# Patient Record
Sex: MALE | URBAN - METROPOLITAN AREA
[De-identification: names, ages, dates, MRNs, and addresses within clinical notes are randomized per-mention and may not be internally consistent; named-entity substitution may affect disease eponyms.]

---

## 2019-09-26 DIAGNOSIS — M25.562 LEFT KNEE PAIN, UNSPECIFIED CHRONICITY: Primary | ICD-10-CM

## 2019-09-30 ENCOUNTER — OFFICE VISIT (OUTPATIENT)
Dept: ORTHOPEDICS | Facility: CLINIC | Age: 29
End: 2019-09-30
Payer: MEDICAID

## 2019-09-30 VITALS
BODY MASS INDEX: 31.83 KG/M2 | DIASTOLIC BLOOD PRESSURE: 70 MMHG | HEIGHT: 68 IN | WEIGHT: 210 LBS | SYSTOLIC BLOOD PRESSURE: 120 MMHG

## 2019-09-30 DIAGNOSIS — S86.812A: ICD-10-CM

## 2019-09-30 DIAGNOSIS — M25.562 ACUTE PAIN OF LEFT KNEE: Primary | ICD-10-CM

## 2019-09-30 PROCEDURE — 76882 US LMTD JT/FCL EVL NVASC XTR: CPT | Mod: 26,S$PBB,, | Performed by: ORTHOPAEDIC SURGERY

## 2019-09-30 PROCEDURE — 76882 US LMTD JT/FCL EVL NVASC XTR: CPT | Mod: PBBFAC,PN | Performed by: ORTHOPAEDIC SURGERY

## 2019-09-30 PROCEDURE — 99999 PR PBB SHADOW E&M-EST. PATIENT-LVL II: ICD-10-PCS | Mod: PBBFAC,,, | Performed by: ORTHOPAEDIC SURGERY

## 2019-09-30 PROCEDURE — 99204 PR OFFICE/OUTPT VISIT, NEW, LEVL IV, 45-59 MIN: ICD-10-PCS | Mod: 25,S$PBB,, | Performed by: ORTHOPAEDIC SURGERY

## 2019-09-30 PROCEDURE — 76882 PR  US,EXTREMITY,NONVASCULAR,REAL-TIME IMAGE,LIMITED: ICD-10-PCS | Mod: 26,S$PBB,, | Performed by: ORTHOPAEDIC SURGERY

## 2019-09-30 PROCEDURE — 99204 OFFICE O/P NEW MOD 45 MIN: CPT | Mod: 25,S$PBB,, | Performed by: ORTHOPAEDIC SURGERY

## 2019-09-30 PROCEDURE — 99999 PR PBB SHADOW E&M-EST. PATIENT-LVL II: CPT | Mod: PBBFAC,,, | Performed by: ORTHOPAEDIC SURGERY

## 2019-09-30 PROCEDURE — 99212 OFFICE O/P EST SF 10 MIN: CPT | Mod: PBBFAC,PN | Performed by: ORTHOPAEDIC SURGERY

## 2019-09-30 RX ORDER — MELOXICAM 15 MG/1
15 TABLET ORAL DAILY
Qty: 30 TABLET | Refills: 0 | Status: SHIPPED | OUTPATIENT
Start: 2019-09-30

## 2019-09-30 NOTE — PROGRESS NOTES
CC: left knee pain    29 y.o. Male presents today for evaluation of his left knee pain. Patient reports a few months ago he was crarwling in an attic and kneeled on a staple. He then got up and twisted at which point he felt a pop in his knee. He reports last week he was helping move heavy bags and twisted his knee again stepping in a hole. He reports he experienced immediate pain and swelling at the bottom aspect of his kneecap and sought care for this problem at urgent care. He was prescribed ibuprofen and given a knee brace. Patient reports his knee pain is anterior and gestures to the patellar tendon. He states his pain is intermittent. 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 Patient denies prior history of knee injury.   How long: Initial knee injury sustained several months ago.   What makes it better: Patient reports pain relief with ibuprofen and avoiding activity.   What makes it worse: Patient reports increased pain when he is on his knee for long periods of time.   Does it radiate: Denies radiating pain.   Attempted treatments: Patient reports he was given a knee brace at urgent care and prescribed ibuprofen.  Pain score: 0/10  Any mechanical symptoms: Patient reports popping with knee flexion and extension.   Feelings of instability: Denies feelings of instability.   Affect on ADLs: Patient reports he has been able to work recently, but has missed time as a result of this injury.     REVIEW OF SYSTEMS:   Constitution: Patient denies fever, chills, night sweats, and weight changes.  Eyes: Patient denies eye pain or vision changes.  HENT: Patient denies headache, ear pain, sore throat, or nasal discharge.  CVS: Patient denies chest pain.  Lungs: Patient denies shortness of breath or cough.  Abd: Patient denies stomach pain, nausea, or vomiting.  Skin: Patient denies  "skin rash or itching.    Hematologic/Lymphatic: Patient denies easy bruising.   Musculoskeletal: Patient denies recent falls. See HPI.  Psych: Patient denies any current anxiety or nervousness.    PAST MEDICAL HISTORY:   History reviewed. No pertinent past medical history.    PAST SURGICAL HISTORY:   History reviewed. No pertinent surgical history.    FAMILY HISTORY:   History reviewed. No pertinent family history.    SOCIAL HISTORY:   Social History     Socioeconomic History    Marital status: Unknown     Spouse name: Not on file    Number of children: Not on file    Years of education: Not on file    Highest education level: Not on file   Occupational History    Not on file   Social Needs    Financial resource strain: Not on file    Food insecurity:     Worry: Not on file     Inability: Not on file    Transportation needs:     Medical: Not on file     Non-medical: Not on file   Tobacco Use    Smoking status: Not on file   Substance and Sexual Activity    Alcohol use: Not on file    Drug use: Not on file    Sexual activity: Not on file   Lifestyle    Physical activity:     Days per week: Not on file     Minutes per session: Not on file    Stress: Not on file   Relationships    Social connections:     Talks on phone: Not on file     Gets together: Not on file     Attends Sabianism service: Not on file     Active member of club or organization: Not on file     Attends meetings of clubs or organizations: Not on file     Relationship status: Not on file   Other Topics Concern    Not on file   Social History Narrative    Not on file       MEDICATIONS:     Current Outpatient Medications:     meloxicam (MOBIC) 15 MG tablet, Take 1 tablet (15 mg total) by mouth once daily., Disp: 30 tablet, Rfl: 0    ALLERGIES:   Review of patient's allergies indicates:  No Known Allergies     PHYSICAL EXAMINATION:  /70   Ht 5' 8" (1.727 m)   Wt 95.3 kg (210 lb)   BMI 31.93 kg/m²   Vitals signs and nursing note " have been reviewed.  General: In no acute distress, well developed, well nourished, no diaphoresis  Eyes: EOM full and smooth, no eye redness or discharge  HENT: normocephalic and atraumatic, neck supple, trachea midline, no nasal discharge, no external ear redness or discharge  Cardiovascular: 2+ and symmetric DP pulses bilaterally, no LE edema  Lungs: respirations non-labored, no conversational dyspnea   Abd: non-distended, no rigidity  MSK: no amputation or deformity, no swelling of extremities  Neuro: AAOx3, CN2-12 grossly intact  Skin: No rashes, warm and dry  Psychiatric: cooperative, pleasant, mood and affect appropriate for age    MUSCULOSKELETAL EXAM:    LEFT KNEE EXAMINATION   Affected side is compared to contralateral knee     Observation:  No edema, erythema, ecchymosis, or effusion noted.  No muscle atrophy of the thighs and calves noted.  No obvious bony deformities noted.   No genu valgus/varum noted. No recurvatum noted.    No tibial internal/external torsion.      Tenderness:  Patella - none      Lateral joint line - none  Quad tendon - none     Medial joint line - none  Patellar tendon - present, inferior pole of patella Medial plica - none  Tibial tubercle - none     Lateral plica - none  Pes anserine - none     MCL prox - none  Distal ITB - none     MCL distal - none  MFC - none      LCL prox - none  LFC - none      LCL distal - none  Tibia - none      Fibula - none    No obvious bursae, plicae, popliteal cysts, or tendon derangement palpated.          ROM:   Active extension to 0° on left without hyperextension, lag, crepitus, or patellar J sign.   Active extension to 0° on right without hyperextension, lag, crepitus, or patellar J sign.   Active flexion to 135° on left and 135° on right.    Strength: (bilaterally)  Knee Flexion - 5/5 on left and 5/5 on right  Knee Extension - 5/5 on left and 5/5 on right  Hip Flexion - 5/5 on left and 5/5 on right  Hip Extension - 5/5 on left and 5/5 on  right  Ankle dorsiflexion - 5/5 on left and 5/5 on right  Ankle Plantarflexion - 5/5 on left and 5/5 on right    Patellofemoral Exam:  Patellar ballottement - negative  Bulge sign - negative  Patellar grind - negative  No patellar laxity with medial and lateral translation   No apprehension with medial and lateral patellar translation.     Meniscus Testing:     No pain with terminal extension and flexion.  Eduardos test - negative   Bounce home test - negative    Ligament Testing:  Lachman's test - negative  No laxity with anterior drawer.  No laxity with posterior drawer.    No posterior sag sign.   No laxity with varus testing at 0 and 30 degrees.  No laxity with valgus testing at 0 and 30 degrees.    Neurovascular Examination:   Normal gait without antalgia.  Sensation intact to light touch in the obturator, lateral/intermediate/medial/posterior femoral cutaneous, saphenous, and common peroneal nerves bilaterally.  Pulses intact at the DP and PT arteries bilaterally.    Capillary refill intact <2 seconds in all toes bilaterally.    IMAGIN. X-ray ordered due to left knee pain  2. X-ray images were reviewed personally by me and then directly with patient.  3. FINDINGS: X-ray images obtained demonstrate no cortical irregularities, sclerosis, osteophyte formation, or subchonral cysts. There is no joint space narrowing. No fracture.  4. IMPRESSION: No pathology or irregularities appreciated.     DIAGNOSTIC ULTRASOUND FOCUSED:  1. Diagnostic Extremity - MSK-Sports Ultrasound was recommended due to left anterior knee pain.  2. Diagnostic Extremity - MSK-Sports Ultrasound Performed: Joshua Javier Extremity Study: left anterior knee/patellar tendon.  TECHNIQUE: Real time ultrasound examination of the left anterior knee/patellar tendons was performed with SonBit9te Edge 2, 9-L MHz linear probe(s).   FINDINGS: The images are of adequate diagnostic quality with identification of all echogenic structures made except  for the vascular structures unless otherwise noted. There is no sonographic evidence of soft tissue edema or nerve irregularities.  The patellar tendon was visualized in long and short axis. There are no obvious abnormalities of the inferior patella or tibial tubercle. The patellar tendon is intact, but there is a small hypoechoic defect at the most proximal aspect of the patellar tendon at the inferior pole of the patella, possibly a small intrasubstance tear as there is a small cortical irregularity at this area as well. The rest of the sonographic examination was unremarkable.  Ultrasound images were directly reviewed with the patient and then a treatment plan was discussed.  IMPRESSION:  Tiny, proximal hypoechoic defect in the patellar tendon with an associated inferior patellar cortical irregularity, concerning for intrasubstance tear vs strain.      ASSESSMENT:      ICD-10-CM ICD-9-CM   1. Acute pain of left knee M25.562 719.46   2. Strain of patellar tendon, left, initial encounter S86.812A 844.8       PLAN:  1-2.  Left knee pain/patellar tendon strain -     - Gerry is here for initial evaluation of patellar tendon pain. He has had 2 recent knee injuries, both involving the left knee. One of which he stepped in a hole and the other he kneeled on to a stable and upon getting up felt a pop in the front of his knee. He states there is swelling immediately after this injury in the area of his inferior patella. He denies any prior injury or trauma to the knee.    - XRs ordered in the office today and images were personally reviewed with the patient. See above for further detail.    - Based on his history and exam findings, I recommended a diagnostic ultrasound of the patellar tendon/anterior knee.  He consents to evaluation.  See above and attached report for further detail.    - He is doing much better at this time with the week off and taking ibuprofen as needed. He is back to work and denies any issues with  what he has done so far.    - Mobic 15 mg to be taken as needed sent to the pharmacy for him.  I also recommend icing 15-20 minutes once to twice daily to the affected area.      Future planning includes - consider PT, MRI if not improving    All questions were answered to the best of my ability and all concerns were addressed at this time.    Follow up in 6 weeks for above, or sooner if needed.      This note is dictated using the M*Modal Fluency Direct word recognition program. There are word recognition mistakes that are occasionally missed on review.

## 2019-09-30 NOTE — LETTER
September 30, 2019      Trip Esparza, [facility]  [address] [facility] [address]           [facility]  [address], [facility] [address]  [facility] [address]  Phone: [phone]  Fax: [phone]          Patient: Gerry Esparza   MR Number: [id number]   YOB: 1990   Date of Visit: 9/30/2019       Dear Trip Esparza:    Thank you for referring Gerry Esparza to me for evaluation. Attached you will find relevant portions of my assessment and plan of care.    If you have questions, please do not hesitate to call me. I look forward to following Gerry Esparza along with you.    Sincerely,    Joshua Javier, DO    Enclosure  CC:  No Recipients    If you would like to receive this communication electronically, please contact [email or web address] or [phone] to request more information on [employer] Link access.    For providers and/or their staff who would like to refer a patient to [facility], please contact us through our one-stop-shop provider referral line, [facility], at [phone].    If you feel you have received this communication in error or would no longer like to receive these types of communications, please e-mail [email or web address]

## 2019-09-30 NOTE — PROGRESS NOTES
DIAGNOSTIC ULTRASOUND FOCUSED:  1. Diagnostic Extremity - MSK-Sports Ultrasound was recommended due to left anterior knee pain.  2. Diagnostic Extremity - MSK-Sports Ultrasound Performed: Joshua Javier Extremity Study: left anterior knee/patellar tendon.    TECHNIQUE: Real time ultrasound examination of the left anterior knee/patellar tendons was performed with Son[employer] Edge 2, 9-L MHz linear probe(s).     FINDINGS: The images are of adequate diagnostic quality with identification of all echogenic structures made except for the vascular structures unless otherwise noted. There is no sonographic evidence of soft tissue edema or nerve irregularities. The patellar tendon was visualized in long and short axis. There are no obvious abnormalities of the inferior patella or tibial tubercle. The patellar tendon is intact, but there is a small hypoechoic defect at the most proximal aspect of the patellar tendon at the inferior pole of the patella, possibly a small intrasubstance tear as there is a small cortical irregularity at this area as well. The rest of the sonographic examination was unremarkable.  Ultrasound images were directly reviewed with the patient and then a treatment plan was discussed.    IMPRESSION:  Small, proximal hypoechoic defect in the patellar tendon with an associated inferior patellar cortical irregularity, concerning for intrasubstance tear vs strain.

## 2019-11-04 ENCOUNTER — TELEPHONE (OUTPATIENT)
Dept: ORTHOPEDICS | Facility: CLINIC | Age: 29
End: 2019-11-04

## 2019-11-04 NOTE — TELEPHONE ENCOUNTER
Patient called reporting he injured his another knee and would like to be seen today. I expressed that we do not have any appointments for a new injury today. I encouraged him to ice, elevate and rest the area and that I would call him back in the event any of our appointments cancel today. I also reported that if he is in severe pain to go to urgent care or the ER. He expressed understanding.

## 2021-11-23 ENCOUNTER — HOSPITAL ENCOUNTER (EMERGENCY)
Facility: HOSPITAL | Age: 31
Discharge: HOME | End: 2021-11-23
Attending: EMERGENCY MEDICINE
Payer: MEDICAID

## 2021-11-23 VITALS
WEIGHT: 195 LBS | OXYGEN SATURATION: 98 % | RESPIRATION RATE: 18 BRPM | TEMPERATURE: 98 F | HEART RATE: 86 BPM | BODY MASS INDEX: 29.55 KG/M2 | DIASTOLIC BLOOD PRESSURE: 90 MMHG | HEIGHT: 68 IN | SYSTOLIC BLOOD PRESSURE: 143 MMHG

## 2021-11-23 DIAGNOSIS — S61.432A PUNCTURE WOUND OF LEFT HAND WITHOUT FOREIGN BODY, INITIAL ENCOUNTER: Primary | ICD-10-CM

## 2021-11-23 PROCEDURE — 99282 PR EMERGENCY DEPT VISIT,LEVEL II: ICD-10-PCS | Mod: ,,, | Performed by: PHYSICIAN ASSISTANT

## 2021-11-23 PROCEDURE — 99283 EMERGENCY DEPT VISIT LOW MDM: CPT | Mod: 25

## 2021-11-23 PROCEDURE — 99282 EMERGENCY DEPT VISIT SF MDM: CPT | Mod: ,,, | Performed by: PHYSICIAN ASSISTANT
